# Patient Record
(demographics unavailable — no encounter records)

---

## 2018-12-18 NOTE — RAD
PORTABLE CHEST ONE VIEW:

 

Date: 12-18-18 

Time: 8:57 a.m.

 

History: Palpitations. 

 

FINDINGS: 

The heart size is normal. The lungs are well expanded without focal areas of consolidation, pneumotho
rax, or pleural effusions. There are degenerative changes in the spine. 

 

IMPRESSION: 

No radiographic evidence of acute cardiopulmonary process. 

 

POS: [facility]

## 2018-12-19 NOTE — EKG
Test Reason : REPEAT

Blood Pressure : ***/*** mmHG

Vent. Rate : 066 BPM     Atrial Rate : 066 BPM

   P-R Int : 168 ms          QRS Dur : 092 ms

    QT Int : 400 ms       P-R-T Axes : 083 005 056 degrees

   QTc Int : 419 ms

 

Sinus rhythm with Premature supraventricular complexes

Otherwise normal ECG

 

Confirmed by ELISSA MADERA, LINDA ([id number]),  YULIANA PERRY ([id number]) on 12/19/2018 12:28:39 PM

 

Referred By: MATTHEW BOWERS           Confirmed By:LINDA BOWERS MD
